# Patient Record
Sex: FEMALE | Race: WHITE | Employment: OTHER | URBAN - METROPOLITAN AREA
[De-identification: names, ages, dates, MRNs, and addresses within clinical notes are randomized per-mention and may not be internally consistent; named-entity substitution may affect disease eponyms.]

---

## 2019-10-06 ENCOUNTER — HOSPITAL ENCOUNTER (EMERGENCY)
Facility: CLINIC | Age: 69
Discharge: HOME | End: 2019-10-06
Attending: EMERGENCY MEDICINE | Admitting: EMERGENCY MEDICINE
Payer: COMMERCIAL

## 2019-10-06 ENCOUNTER — APPOINTMENT (OUTPATIENT)
Dept: GENERAL RADIOLOGY | Facility: CLINIC | Age: 69
End: 2019-10-06
Attending: EMERGENCY MEDICINE
Payer: COMMERCIAL

## 2019-10-06 VITALS
HEIGHT: 67 IN | HEART RATE: 61 BPM | RESPIRATION RATE: 18 BRPM | BODY MASS INDEX: 30.61 KG/M2 | OXYGEN SATURATION: 100 % | DIASTOLIC BLOOD PRESSURE: 88 MMHG | SYSTOLIC BLOOD PRESSURE: 133 MMHG | TEMPERATURE: 98.4 F | WEIGHT: 195 LBS

## 2019-10-06 DIAGNOSIS — R07.89 ATYPICAL CHEST PAIN: ICD-10-CM

## 2019-10-06 LAB
ANION GAP SERPL CALCULATED.3IONS-SCNC: 7 MMOL/L (ref 3–14)
BASOPHILS # BLD AUTO: 0 10E9/L (ref 0–0.2)
BASOPHILS NFR BLD AUTO: 0.3 %
BUN SERPL-MCNC: 26 MG/DL (ref 7–30)
CALCIUM SERPL-MCNC: 8.5 MG/DL (ref 8.5–10.1)
CHLORIDE SERPL-SCNC: 107 MMOL/L (ref 94–109)
CO2 SERPL-SCNC: 26 MMOL/L (ref 20–32)
CREAT SERPL-MCNC: 0.7 MG/DL (ref 0.52–1.04)
DIFFERENTIAL METHOD BLD: NORMAL
EOSINOPHIL # BLD AUTO: 0 10E9/L (ref 0–0.7)
EOSINOPHIL NFR BLD AUTO: 0.2 %
ERYTHROCYTE [DISTWIDTH] IN BLOOD BY AUTOMATED COUNT: 12.8 % (ref 10–15)
GFR SERPL CREATININE-BSD FRML MDRD: 87 ML/MIN/{1.73_M2}
GLUCOSE SERPL-MCNC: 108 MG/DL (ref 70–99)
HCT VFR BLD AUTO: 42.5 % (ref 35–47)
HGB BLD-MCNC: 14.6 G/DL (ref 11.7–15.7)
IMM GRANULOCYTES # BLD: 0 10E9/L (ref 0–0.4)
IMM GRANULOCYTES NFR BLD: 0.2 %
INTERPRETATION ECG - MUSE: NORMAL
LYMPHOCYTES # BLD AUTO: 0.8 10E9/L (ref 0.8–5.3)
LYMPHOCYTES NFR BLD AUTO: 13.8 %
MCH RBC QN AUTO: 31.3 PG (ref 26.5–33)
MCHC RBC AUTO-ENTMCNC: 34.4 G/DL (ref 31.5–36.5)
MCV RBC AUTO: 91 FL (ref 78–100)
MONOCYTES # BLD AUTO: 0.3 10E9/L (ref 0–1.3)
MONOCYTES NFR BLD AUTO: 4.4 %
NEUTROPHILS # BLD AUTO: 4.7 10E9/L (ref 1.6–8.3)
NEUTROPHILS NFR BLD AUTO: 81.1 %
NRBC # BLD AUTO: 0 10*3/UL
NRBC BLD AUTO-RTO: 0 /100
PLATELET # BLD AUTO: 257 10E9/L (ref 150–450)
POTASSIUM SERPL-SCNC: 3.6 MMOL/L (ref 3.4–5.3)
RBC # BLD AUTO: 4.67 10E12/L (ref 3.8–5.2)
SODIUM SERPL-SCNC: 140 MMOL/L (ref 133–144)
TROPONIN I SERPL-MCNC: <0.015 UG/L (ref 0–0.04)
WBC # BLD AUTO: 5.9 10E9/L (ref 4–11)

## 2019-10-06 PROCEDURE — 99285 EMERGENCY DEPT VISIT HI MDM: CPT | Mod: 25

## 2019-10-06 PROCEDURE — 80048 BASIC METABOLIC PNL TOTAL CA: CPT | Performed by: EMERGENCY MEDICINE

## 2019-10-06 PROCEDURE — 71046 X-RAY EXAM CHEST 2 VIEWS: CPT

## 2019-10-06 PROCEDURE — 84484 ASSAY OF TROPONIN QUANT: CPT | Performed by: EMERGENCY MEDICINE

## 2019-10-06 PROCEDURE — 85025 COMPLETE CBC W/AUTO DIFF WBC: CPT | Performed by: EMERGENCY MEDICINE

## 2019-10-06 PROCEDURE — 93005 ELECTROCARDIOGRAM TRACING: CPT

## 2019-10-06 PROCEDURE — 25000132 ZZH RX MED GY IP 250 OP 250 PS 637: Mod: GY | Performed by: EMERGENCY MEDICINE

## 2019-10-06 RX ORDER — ASPIRIN 81 MG/1
324 TABLET, CHEWABLE ORAL ONCE
Status: COMPLETED | OUTPATIENT
Start: 2019-10-06 | End: 2019-10-06

## 2019-10-06 RX ADMIN — ASPIRIN 81 MG 324 MG: 81 TABLET ORAL at 11:02

## 2019-10-06 ASSESSMENT — ENCOUNTER SYMPTOMS
SHORTNESS OF BREATH: 1
DIAPHORESIS: 1
CHEST TIGHTNESS: 1

## 2019-10-06 ASSESSMENT — MIFFLIN-ST. JEOR: SCORE: 1437.14

## 2019-10-06 NOTE — ED TRIAGE NOTES
Chest tightness across upper chest after climbing 100 plus stairs from bottom of a cave she visited yesterday.

## 2019-10-06 NOTE — ED PROVIDER NOTES
"  History     Chief Complaint:  Chest Pain    HPI   Julienne Torres is a 68 year old female who presents to the ED for evaluation of chest pain. The patient reports that she and her  returned from vacation yesterday, during which they went hiking in caves down in [place], [place]. The patient states that as their group was exiting a cave, the people leading the group went at a fast pace, and while trying to keep up, the patient became short of breath. The patient reports that she has experienced chest pain and tightness since yesterday, and in the ED she is diaphoretic.     Allergies:  The patient has no known drug allergies.    Medications:    Metoprolol  Premarin    Past Medical History:    Granulosa Cell-Theca Cell tumor  Hypertension     Past Surgical History:    Appendectomy  Bilateral oophorectomy  Total abd hysterectomy    Family History:    Prostate cancer, father    Social History:  Negative for tobacco use.  Negative for alcohol use.  Negative for drug use.  Marital Status:   [2]     Review of Systems   Constitutional: Positive for diaphoresis.   Respiratory: Positive for chest tightness and shortness of breath.    Cardiovascular: Positive for chest pain.   All other systems reviewed and are negative.      Physical Exam     Patient Vitals for the past 24 hrs:   BP Temp Temp src Pulse Resp SpO2 Height Weight   10/06/19 1027 (!) 175/95 98.4  F (36.9  C) Oral 75 18 100 % 1.702 m (5' 7\") 88.5 kg (195 lb)     Physical Exam  Eyes:  The pupils are equal and round    Conjunctivae and sclerae are normal  ENT:    The nose is normal    Pinnae are normal  CV:  Regular rate and rhythm     No edema  Resp:  Lungs are clear    Non-labored    No rales    No wheezing   GI:  Abdomen is soft and non-tender, there is no rigidity    No distension    No rebound tenderness   MS:  Normal muscular tone    No asymmetric leg swelling  Skin:  No rash or acute skin lesions noted  Neuro:   Awake, alert.      Speech is normal " and fluent.    Face is symmetric.     Moves all extremities    Emergency Department Course   ECG:  Indication: Chest Pain  Time: 1026  Vent. Rate 69 bpm. NY interval 172. QRS duration 96. QT/QTc 422/452. P-R-T axis 43 -56 22.  Normal sinus rhythm. Possible left atrial enlargement. Left anterior fascicular block. Left ventricular hypertrophy. Abnormal ECG. No significant change compared to EKG dated 12/22/. Read time: 12/22/2008    Imaging:  Radiographic findings were communicated with the patient who voiced understanding of the findings.  XR Chest 2 views:   There are no acute infiltrates. The cardiac silhouette is  not enlarged. Pulmonary vasculature is unremarkable as per radiology.    Laboratory:  CBC: WBC: 5.9, HGB: 14.6, PLT: 257  BMP: Glucose 108 (H), o/w WNL (Creatinine: 0.70)  1045 Troponin: <0.015    Interventions:  1102 Aspirin chewable tablet 324 mg PO    Emergency Department Course:  Nursing notes and vitals reviewed. (1037) I performed an exam of the patient as documented above.     ECG was obtained    IV inserted. Medicine administered as documented above. Blood drawn. This was sent to the lab for further testing, results above.     The patient was sent for a chest x-ray while in the emergency department, findings above.     1143 I rechecked the patient and discussed the results of her workup thus far.     Findings and plan explained to the Patient. Patient discharged home with instructions regarding supportive care, medications, and reasons to return. The importance of close follow-up was reviewed.     Impression & Plan    Medical Decision Making:  Julienne Torres is a 68 year old female who presents the emergency department with chest pain.  She reports that she thinks that she might have muscular chest pain.  She developed pain yesterday and is been constant since yesterday.  She notes it is worse when her shoulders are stretched backward.  EKG here is unchanged from her most recent EKG which was  greater than 10 years ago.  Troponin is undetectable and, given the duration of her symptoms, I doubt ACS or MI based on the duration of her symptoms and negative troponin.  No indication for repeat troponin.  Symptoms are not suggestive of pulmonary embolism.  Oxygenation level, heart rate and lack of leg swelling.  In addition, the description of her symptoms is would be very atypical for a pulmonary embolism.  Chest x-ray is normal here.  Suspect musculoskeletal cause.  She can follow-up with her doctor.  Return with any new or concerning symptoms.    Diagnosis:    ICD-10-CM   1. Atypical chest pain R07.89       Disposition:  The patient was discharged to home.    Scribe Disclosure:  IDanielle, am serving as a scribe on 10/6/2019 at 10:37 AM to personally document services performed by Arnoldo Talavera MD based on my observations and the provider's statements to me.     Danielle Chauhan  10/6/2019    EMERGENCY DEPARTMENT       Arnoldo Talavera MD  10/06/19 [id number]

## 2019-10-06 NOTE — ED AVS SNAPSHOT
Emergency Department  [address] [place] [address]  Phone:  [phone]  Fax:  [phone]                                    Julienne Torres   MRN: [id number]    Department:   Emergency Department   Date of Visit:  10/6/2019           After Visit Summary Signature Page    I have received my discharge instructions, and my questions have been answered. I have discussed any challenges I see with this plan with the nurse or doctor.    ..........................................................................................................................................  Patient/Patient Representative Signature      ..........................................................................................................................................  Patient Representative Print Name and Relationship to Patient    ..................................................               ................................................  Date                                   Time    ..........................................................................................................................................  Reviewed by Signature/Title    ...................................................              ..............................................  Date                                               Time          22EPIC Rev 08/18

## 2021-03-21 ENCOUNTER — HEALTH MAINTENANCE LETTER (OUTPATIENT)
Age: 71
End: 2021-03-21

## 2021-09-05 ENCOUNTER — HEALTH MAINTENANCE LETTER (OUTPATIENT)
Age: 71
End: 2021-09-05

## 2022-02-22 ENCOUNTER — HOSPITAL ENCOUNTER (OUTPATIENT)
Dept: MAMMOGRAPHY | Facility: CLINIC | Age: 72
Discharge: HOME | End: 2022-02-22
Attending: OBSTETRICS & GYNECOLOGY | Admitting: OBSTETRICS & GYNECOLOGY
Payer: COMMERCIAL

## 2022-02-22 DIAGNOSIS — Z12.31 BREAST CANCER SCREENING BY MAMMOGRAM: ICD-10-CM

## 2022-02-22 PROCEDURE — 77067 SCR MAMMO BI INCL CAD: CPT

## 2022-04-17 ENCOUNTER — HEALTH MAINTENANCE LETTER (OUTPATIENT)
Age: 72
End: 2022-04-17

## 2022-07-29 ENCOUNTER — TRANSFERRED RECORDS (OUTPATIENT)
Dept: MULTI SPECIALTY CLINIC | Facility: CLINIC | Age: 72
End: 2022-07-29

## 2022-10-23 ENCOUNTER — HEALTH MAINTENANCE LETTER (OUTPATIENT)
Age: 72
End: 2022-10-23

## 2022-11-21 ENCOUNTER — OFFICE VISIT (OUTPATIENT)
Dept: FAMILY MEDICINE | Facility: CLINIC | Age: 72
End: 2022-11-21
Payer: COMMERCIAL

## 2022-11-21 VITALS
WEIGHT: 204 LBS | DIASTOLIC BLOOD PRESSURE: 94 MMHG | SYSTOLIC BLOOD PRESSURE: 150 MMHG | OXYGEN SATURATION: 99 % | HEART RATE: 73 BPM | HEIGHT: 67 IN | TEMPERATURE: 98.4 F | BODY MASS INDEX: 32.02 KG/M2 | RESPIRATION RATE: 16 BRPM

## 2022-11-21 DIAGNOSIS — Z01.818 PREOP GENERAL PHYSICAL EXAM: Primary | ICD-10-CM

## 2022-11-21 DIAGNOSIS — H26.9 CATARACT, UNSPECIFIED CATARACT TYPE, UNSPECIFIED LATERALITY: ICD-10-CM

## 2022-11-21 PROCEDURE — 99204 OFFICE O/P NEW MOD 45 MIN: CPT | Performed by: NURSE PRACTITIONER

## 2022-11-21 RX ORDER — CETIRIZINE HYDROCHLORIDE 10 MG/1
10 TABLET ORAL DAILY PRN
COMMUNITY

## 2022-11-21 RX ORDER — IBUPROFEN 200 MG
200 TABLET ORAL EVERY 4 HOURS PRN
COMMUNITY

## 2022-11-21 ASSESSMENT — PAIN SCALES - GENERAL: PAINLEVEL: NO PAIN (0)

## 2022-11-21 NOTE — Clinical Note
Please abstract the following data from this visit with this patient into the appropriate field in Epic:  Tests that can be patient reported without a hard copy:  Colonoscopy done on this date: 7-29-22  by this group: [facility], results were normal 1 polyp, repeat 5 years .

## 2022-11-21 NOTE — PATIENT INSTRUCTIONS
Preparing for Your Surgery  Getting started  A nurse will call you to review your health history and instructions. They will give you an arrival time based on your scheduled surgery time. Please be ready to share:    Your doctor s clinic name and phone number    Your medical, surgical, and anesthesia history    A list of allergies and sensitivities    A list of medicines, including herbal treatments and over-the-counter drugs    Whether the patient has a legal guardian (ask how to send us the papers in advance)  Please tell us if you re pregnant--or if there s any chance you might be pregnant. Some surgeries may injure a fetus (unborn baby), so they require a pregnancy test. Surgeries that are safe for a fetus don t always need a test, and you can choose whether to have one.   If you have a child who s having surgery, please ask for a copy of Preparing for Your Child s Surgery.    Preparing for surgery    Within 10 to 30 days of surgery: Have a pre-op exam (sometimes called an H&P, or History and Physical). This can be done at a clinic or pre-operative center.  ? If you re having a , you may not need this exam. Talk to your care team.    At your pre-op exam, talk to your care team about all medicines you take. If you need to stop any medicines before surgery, ask when to start taking them again.  ? We do this for your safety. Many medicines can make you bleed too much during surgery. Some change how well surgery (anesthesia) drugs work.    Call your insurance company to let them know you re having surgery. (If you don t have insurance, call [phone].)    Call your clinic if there s any change in your health. This includes signs of a cold or flu (sore throat, runny nose, cough, rash, fever). It also includes a scrape or scratch near the surgery site.    If you have questions on the day of surgery, call your hospital or surgery center.  COVID testing  You may need to be tested for COVID-19 before having  surgery. If so, we will give you instructions (or click here).  Eating and drinking guidelines  For your safety: Unless your surgeon tells you otherwise, follow the guidelines below.    Eat and drink as usual until 8 hours before you arrive for surgery. After that, no food or milk.    Drink clear liquids until 2 hours before you arrive. These are liquids you can see through, like water, Gatorade, and Propel Water. They also include plain black coffee and tea (no cream or milk), candy, and breath mints. You can spit out gum when you arrive.    If you drink alcohol: Stop drinking it the night before surgery.    If your care team tells you to take medicine on the morning of surgery, it s okay to take it with a sip of water.  Preventing infection    Shower or bathe the night before and morning of your surgery. Follow the instructions your clinic gave you. (If no instructions, use regular soap.)    Don t shave or clip hair near your surgery site. We ll remove the hair if needed.    Don t smoke or vape the morning of surgery. You may chew nicotine gum up to 2 hours before surgery. A nicotine patch is okay.  ? Note: Some surgeries require you to completely quit smoking and nicotine. Check with your surgeon.    Your care team will make every effort to keep you safe from infection. We will:  ? Clean our hands often with soap and water (or an alcohol-based hand rub).  ? Clean the skin at your surgery site with a special soap that kills germs.  ? Give you a special gown to keep you warm. (Cold raises the risk of infection.)  ? Wear special hair covers, masks, gowns and gloves during surgery.  ? Give antibiotic medicine, if prescribed. Not all surgeries need antibiotics.  What to bring on the day of surgery    Photo ID and insurance card    Copy of your health care directive, if you have one    Glasses and hearing aids (bring cases)  ? You can t wear contacts during surgery    Inhaler and eye drops, if you use them (tell us  about these when you arrive)    CPAP machine or breathing device, if you use them    A few personal items, if spending the night    If you have . . .  ? A pacemaker, ICD (cardiac defibrillator) or other implant: Bring the ID card.  ? An implanted stimulator: Bring the remote control.  ? A legal guardian: Bring a copy of the certified (court-stamped) guardianship papers.  Please remove any jewelry, including body piercings. Leave jewelry and other valuables at home.  If you re going home the day of surgery    You must have a responsible adult drive you home. They should stay with you overnight as well.    If you don t have someone to stay with you, and you aren t safe to go home alone, we may keep you overnight. Insurance often won t pay for this.  After surgery  If it s hard to control your pain or you need more pain medicine, please call your surgeon s office.  Questions?   If you have any questions for your care team, list them here:   ____________________________________________________________________________________________________________________________________________________________________________________________________________________________________________________________________  For informational purposes only. Not to replace the advice of your health care provider. Copyright   2003, 2019 [employer]. All rights reserved. Clinically reviewed by Wanda Morales MD. [employer] [id number] - REV 10/22.

## 2022-11-21 NOTE — PROGRESS NOTES
[address], [address]  [facility] [address]  Phone: [phone]  Primary Provider: No Ref-Primary, Physician  Pre-op Performing Provider: KATARINA PENA      PREOPERATIVE EVALUATION:  Today's date: 11/21/2022    Julienne Torres is a 72 year old female who presents for a preoperative evaluation.    Surgical Information:  Surgery/Procedure: Cataract   Surgery Location: [facility]   Surgeon: Keegan   Surgery Date: 12-2-22 RIGHT eye then 12-16-22 LEFT eye  Time of Surgery: AM  Where patient plans to recover: At home with family  Fax number for surgical facility: [phone]    Type of Anesthesia Anticipated: Local with MAC    Assessment & Plan     The proposed surgical procedure is considered LOW risk.    (Z01.818) Preop general physical exam  (primary encounter diagnosis)  Comment: ok for surgery. No concerns today. BP is elevated today but she brought her records of recent BP that was normal just a couple months ago.  She is a little anxious today.   Plan:     (H26.9) Cataract, unspecified cataract type, unspecified laterality  Comment:   Plan:            Risks and Recommendations:  The patient has the following additional risks and recommendations for perioperative complications:   - No identified additional risk factors other than previously addressed    Medication Instructions:  Patient is to take all scheduled medications on the day of surgery    RECOMMENDATION:  APPROVAL GIVEN to proceed with proposed procedure, without further diagnostic evaluation.        Subjective     HPI related to upcoming procedure: going for cataract  Has been feeling well lately. No CP, SOB.   Doctors with OB for primary care   Was just at OB in May and BP was 124/80. Sept 2 130/74 (brought in records)  Sept 2 of this year was dx lyme        Preop Questions 11/16/2022   1. Have you ever had a heart attack or stroke? No   2. Have you ever had surgery on your heart or blood  vessels, such as a stent placement, a coronary artery bypass, or surgery on an artery in your head, neck, heart, or legs? No   3. Do you have chest pain with activity? No   4. Do you have a history of  heart failure? No   5. Do you currently have a cold, bronchitis or symptoms of other infection? No   6. Do you have a cough, shortness of breath, or wheezing? No   7. Do you or anyone in your family have previous history of blood clots? No   8. Do you or does anyone in your family have a serious bleeding problem such as prolonged bleeding following surgeries or cuts? No   9. Have you ever had problems with anemia or been told to take iron pills? No   10. Have you had any abnormal blood loss such as black, tarry or bloody stools, or abnormal vaginal bleeding? No   11. Have you ever had a blood transfusion? No   12. Are you willing to have a blood transfusion if it is medically needed before, during, or after your surgery? Yes   13. Have you or any of your relatives ever had problems with anesthesia? No   14. Do you have sleep apnea, excessive snoring or daytime drowsiness? No   15. Do you have any artifical heart valves or other implanted medical devices like a pacemaker, defibrillator, or continuous glucose monitor? No   16. Do you have artificial joints? No   17. Are you allergic to latex? No       Health Care Directive:  Patient does not have a Health Care Directive or Living Will:     Preoperative Review of :   reviewed - no record of controlled substances prescribed.      Status of Chronic Conditions:  See problem list for active medical problems.  Problems all longstanding and stable, except as noted/documented.  See ROS for pertinent symptoms related to these conditions.      Review of Systems  Constitutional, neuro, ENT, endocrine, pulmonary, cardiac, gastrointestinal, genitourinary, musculoskeletal, integument and psychiatric systems are negative, except as otherwise noted.    Patient Active Problem List  "   Diagnosis Date Noted     CARDIOVASCULAR SCREENING; LDL GOAL LESS THAN 160 10/31/2010     Priority: Medium     Abnormal ECG 12/22/2008     Priority: Medium      Past Medical History:   Diagnosis Date     Abnormal ECG 12/22/2008     Granulosa cell-theca cell tumor      Hypertension      Past Surgical History:   Procedure Laterality Date     C APPENDECTOMY  1972     C REMOVAL OF OVARY(S)  11/1994    bilateral     C TOTAL ABDOM HYSTERECTOMY  11/1994    due granulosa theca cell tumor     Current Outpatient Medications   Medication Sig Dispense Refill     METOPROLOL SUCCINATE# 25 MG OR TB24 1 TABLET DAILY 30 0     PREMARIN 0.3 MG OR TABS 1 EVERY OTHER DAILY         No Known Allergies     Social History     Tobacco Use     Smoking status: Never     Smokeless tobacco: Not on file   Substance Use Topics     Alcohol use: No     Family History   Problem Relation Age of Onset     Cancer Father         prostate     Cardiovascular Maternal Grandmother         heart attack     Cancer Maternal Grandfather         lung cancer     History   Drug Use No         Objective     BP (!) 150/94 (BP Location: Left arm)   Pulse 73   Temp 98.4  F (36.9  C) (Temporal)   Resp 16   Ht 1.702 m (5' 7\")   Wt 92.5 kg (204 lb)   SpO2 99%   BMI 31.95 kg/m      Physical Exam    GENERAL APPEARANCE: healthy, alert and no distress     EYES: EOMI,      RESP: lungs clear to auscultation - no rales, rhonchi or wheezes     CV: regular rates and rhythm, normal S1 S2, no S3 or S4 and no murmur, click or rub     MS: extremities normal- no gross deformities noted, no evidence of inflammation in joints, FROM in all extremities.     SKIN: no suspicious lesions or rashes     NEURO: Normal strength and tone, sensory exam grossly normal, mentation intact and speech normal     PSYCH: mentation appears normal. and affect normal/bright       No results for input(s): HGB, PLT, INR, NA, POTASSIUM, CR, A1C in the last 92162 hours.     Diagnostics:  No labs were " ordered during this visit.   No EKG required for low risk surgery (cataract, skin procedure, breast biopsy, etc).    Revised Cardiac Risk Index (RCRI):  The patient has the following serious cardiovascular risks for perioperative complications:   - No serious cardiac risks = 0 points     RCRI Interpretation: 0 points: Class I (very low risk - 0.4% complication rate)           Signed Electronically by: BRIA Kraus CNP  Copy of this evaluation report is provided to requesting physician.

## 2023-06-01 ENCOUNTER — HEALTH MAINTENANCE LETTER (OUTPATIENT)
Age: 73
End: 2023-06-01

## 2023-07-11 ENCOUNTER — OFFICE VISIT (OUTPATIENT)
Dept: VASCULAR SURGERY | Facility: CLINIC | Age: 73
End: 2023-07-11
Payer: COMMERCIAL

## 2023-07-11 DIAGNOSIS — I83.813 VARICOSE VEINS OF BILATERAL LOWER EXTREMITIES WITH PAIN: Primary | ICD-10-CM

## 2023-07-11 PROCEDURE — 99203 OFFICE O/P NEW LOW 30 MIN: CPT | Mod: GT | Performed by: SURGERY

## 2023-07-11 NOTE — NURSING NOTE
Patient Reported symptoms:    Bilateral leg   Heaviness A little of the time   Achiness Some of the time   Swelling Some of the time   Throbbing None of the time   Itching Some of the time   Appearance Very noticeable   Impact on work/activities Mildly reduced

## 2023-07-11 NOTE — LETTER
7/11/2023         RE: Julienne Torres  [facility] [address] [place] [id number]        Dear Colleague,    Thank you for referring your patient, Julienne Torres, to the [facility] [place]. Please see a copy of my visit note below.    No notes on file    Again, thank you for allowing me to participate in the care of your patient.        Sincerely,        Vasquez Carty MD

## 2023-07-11 NOTE — PROGRESS NOTES
[facility] CONSULTATION    HPI:    Julienne Torres is a pleasant 72 year old female who presents with complaints of bilateral lower extremity pain and and varicose veins.  Varicose veins have been present for about 15 years. The pain is described as an aching, tiredness, heaviness, worse when standing for long periods of time or sitting for long periods of time and improving with elevation leg and ibuprofen on an as-needed basis..  The patient has not used compression hose to any significant extent.  Symptoms located primarily in the lateral thighs and make it difficult for her to sit for long periods of time without having to reposition her legs or to get up and take short walks.  Additionally, when she is gardening, the pain causes her to have to take breaks as well.    She has a history of venous thromboembolism but does admit to mild swelling of her legs toward the end of the day.    Her family history is significant for varicose veins.    PAST MEDICAL HISTORY:   Past Medical History:   Diagnosis Date    Abnormal ECG 12/22/2008    Granulosa cell-theca cell tumor     Hypertension        PAST SURGICAL HISTORY:   Past Surgical History:   Procedure Laterality Date    ZZC APPENDECTOMY  1972    ZZC REMOVAL OF OVARY(S)  11/1994    bilateral    ZZC TOTAL ABDOM HYSTERECTOMY  11/1994    due granulosa theca cell tumor       FAMILY HISTORY:   Family History   Problem Relation Age of Onset    Cancer Father         prostate    Cardiovascular Maternal Grandmother         heart attack    Cancer Maternal Grandfather         lung cancer       SOCIAL HISTORY:   Social History     Tobacco Use    Smoking status: Never    Smokeless tobacco: Never   Substance Use Topics    Alcohol use: No       REVIEW OF SYSTEMS: Review Of Systems  Skin: negative  Eyes: negative  Ears/Nose/Throat: negative  Respiratory: No shortness of breath, dyspnea on exertion, cough, or hemoptysis  Cardiovascular: negative  Gastrointestinal:  negative  Genitourinary: negative  Musculoskeletal: negative  Neurologic: negative  Psychiatric: negative  Hematologic/Lymphatic/Immunologic: negative  Endocrine: negative      Vital signs:  There were no vitals taken for this visit.    Current Outpatient Medications   Medication Sig Dispense Refill    ibuprofen (ADVIL/MOTRIN) 200 MG tablet Take 200 mg by mouth every 4 hours as needed for pain      cetirizine (ZYRTEC) 10 MG tablet Take 10 mg by mouth daily as needed for allergies (Patient not taking: Reported on 7/11/2023)         PHYSICAL EXAM:  General: Pleasant, NAD.   HEENT: Normocephalic, atraumatic, external ears and nose normal.   Respiratory: Normal respiratory effort.   Cardiovascular: Pulse is regular.   Musculoskeletal: Gait and station normal.  The joints of her fingers and toes without deformity.  There is no cyanosis of her nailbeds.   EXTREMITIES: Right lower extremity: Prominent reticular veins over the lateral distal right thighs with interspersed telangiectasias.  Small varicose veins on the posterior lateral right thigh extending onto the lateral right leg.  No significant stasis changes    Left lower extremity: Similar, intense, proliferative telangiectasias and reticular veins as well as small varicose veins on the posterior lateral left thigh.  This.    PULSES: R/L (3=normal pulse, 0=no palpable pulse) dorsalis pedis: 1/2; posterior tibial: 3/3.      Neurologic: Grossly normal  Psychiatric: Mood, affect, judgment and insight are normal     ASSESSMENT:  Bilateral lower extremity pain, primarily anterolateral and posterior lateral thighs.  We discussed lower extremity vein anatomy, the pathophysiology of venous insufficiency and the option of continued conservative measures.  We utilized a leg vein drawing during this discussion.  The symptoms are significant enough that the patient has to change her actives of daily living to accommodate them.    She would like to proceed with bilateral lower  extremity venous competency studies.  We briefly discussed options for treating superficial axial incompetence utilizing endovenous ablation and treatment of superficial veins with sclerotherapy either under ultrasound guidance or direct vision.  She voiced understanding of the discussion.  Risk and benefits were also discussed.    PLAN:  1.  Bilateral lower extremity venous competency study with video visit to discuss results  2.  Begin conservative treatment with compression    Vasquez Carty MD    Dictated using Dragon voice recognition software which may result in transcription errors          [facility] LEG DRAWING:

## 2023-08-04 ENCOUNTER — VIRTUAL VISIT (OUTPATIENT)
Dept: VASCULAR SURGERY | Facility: CLINIC | Age: 73
End: 2023-08-04
Attending: SURGERY
Payer: COMMERCIAL

## 2023-08-04 ENCOUNTER — ANCILLARY PROCEDURE (OUTPATIENT)
Dept: ULTRASOUND IMAGING | Facility: CLINIC | Age: 73
End: 2023-08-04
Attending: SURGERY
Payer: COMMERCIAL

## 2023-08-04 DIAGNOSIS — I83.813 VARICOSE VEINS OF BILATERAL LOWER EXTREMITIES WITH PAIN: ICD-10-CM

## 2023-08-04 DIAGNOSIS — I83.813 VARICOSE VEINS OF BILATERAL LOWER EXTREMITIES WITH PAIN: Primary | ICD-10-CM

## 2023-08-04 PROCEDURE — 93970 EXTREMITY STUDY: CPT | Performed by: SURGERY

## 2023-08-04 PROCEDURE — 99213 OFFICE O/P EST LOW 20 MIN: CPT | Mod: VID | Performed by: SURGERY

## 2023-08-04 NOTE — LETTER
2023         RE: Julienne Torres  [address] [place] [id number]        Dear Colleague,    Thank you for referring your patient, Julienne Torres, to the [facility] [place]. Please see a copy of my visit note below.    Julienne is a 72 year old who is being evaluated via a billable video visit.      How would you like to obtain your AVS? MyChart  If the video visit is dropped, the invitation should be resent by: Text to cell phone: [phone]  Will anyone else be joining your video visit? No    Video-Visit Details    Type of service:  Video Visit   Video Start Time: 4:22 PM  Video End Time: 4:33 PM    Originating Location (pt. Location): Home    Distant Location (provider location):  On-site    Platform used for Video Visit: [employer]    [facility] [place] Progress Note    Julienne Torres presents in follow-up of bilateral lower extremity pain and varicose veins.  Please see my consultation of 2023 for details.  She returned on 2023 for bilateral lower extremity venous competency studies, the results which we will discuss on today's video visit.    Physical Exam  General: Pleasant female in no acute distress.  Blood pressure 139/80, pulse 60  Extremities: Right lower extremity: Prominent reticular veins over the lateral distal right thighs with interspersed telangiectasias.  Small varicose veins on the posterior lateral right thigh extending onto the lateral right leg.  No significant stasis changes     Left lower extremity: Similar, intense, proliferative telangiectasias and reticular veins as well as small varicose veins on the posterior lateral left thigh    Ultrasound:      A  Narrative & Impression   Verena Adan on 2023  4:10 PM   Name:  Julienne Torres                                                       Patient ID: [id number]  Date: 2023                                                  :  1950  Sex: female                                                                 Examined by: SMITH Adan RVT  Age:  72 year old                                                         Reading MD: CLIFF     INDICATION:  Varicose veins of bilateral lower extremities with pain.     EXAM TYPE  BILATERAL LOWER EXTREMITY VENOUS DUPLEX FOR VENOUS INSUFFICIENCY  TECHNICAL SUMMARY     A duplex ultrasound study using color flow was performed, to evaluate the bilateral lower extremity veins for valvular incompetence with the patient in a steep reversed trendelenberg.      RIGHT:     The deep veins demonstrate phasic flow, compress, and respond to augmentations.  There is no evidence of DVT.  The proximal femoral vein is incompetent and free of thrombus. The remaining deep veins are competent and free of thrombus.      The GSV demonstrates phasic flow, compresses, and responds to augmentations from the saphenofemoral junction to the ankle with no evidence of thrombus. The greater saphenous vein measures 6.4 mm at the saphenofemoral junction, 6.1 mm in the proximal thigh, and 4.0 mm at the knee. The GSV courses superficially out of the fascia form the distal thigh to the distal calf. The GSV is incompetent at the knee and mid calf with the greatest reflux time of 1254 milliseconds. The GSV gives rise to multiple incompetent varicose veins, the largest measures 3.9 mm off the proximal calf that courses medially with a reflux time of 1485 milliseconds.      The AASV is competent/incompetent ( 3.8 mm) draining into the saphenofemoral junction.      The Giacomini vein is incompetent ( 3.3  mm)  in the distal thigh and courses deep (1.6 mm) in the mid thigh.  The Giacomini vein gives rise to a varicose vein measuring 4.0 mm that courses posteromedially.     The SSV demonstrates phasic flow, compresses, and responds to augmentations from the popliteal space to the ankle.  No thrombus is seen. The saphenopopliteal junction is  absent. The SSV is incompetent from the proximal to mid calf with a reflux time of 8215 milliseconds.       Perforators:  There is an incompetent  vein ( 4.2 mm) at  15 cm from medial malleolus that communicates with the GSV.      LEFT:     The deep veins demonstrate phasic flow, compress, and respond to augmentations.  There is no evidence of DVT.  The common femoral vein is incompetent and free of thrombus. The remaining deep veins are competent and free of thrombus.      The GSV demonstrates phasic flow, compresses, and responds to augmentations from the saphenofemoral junction to the ankle with no evidence of thrombus. The greater saphenous vein measures 6.4 mm at the saphenofemoral junction, 3.9 mm in the proximal thigh, and 3.2 mm at the knee. The GSV is incompetent in the proximal thigh and again from the distal thigh to the distal calf with the greatest reflux time of 4916 milliseconds.       The AASV is not visualized.      The Giacomini vein is absent.     The SSV demonstrates phasic flow, compresses, and responds to augmentations from the popliteal space to the ankle.  No reflux or thrombus is seen.. The saphenopopliteal junction is absent.     Perforators: There is an incompetent  vein ( 4.1 mm) at  10 cm from the medial knee crease in the distal thigh that communicates with a varicose vein that measures 5.5 mm.     FINAL SUMMARY:  Right lower extremity:  Deep veins  1.  No deep vein thrombosis  2.  Right proximal femoral vein incompetence     Superficial veins  1.  Segmental, insignificant right knee at the mid calf great saphenous vein incompetence  2.  Right distal Vein of Giacomini and proximal to mid calf small saphenous vein incompetence  3.  4 mm diameter incompetent vein branch coursing down the posterior thigh, communicating with the Vein of Giacomini and small saphenous veins  4.  Incompetent  right mid medial calf     Left lower extremity:  Deep veins  1.  No deep  vein thrombosis  2.  Left common femoral vein incompetence     Superficial veins  1.  Left great saphenous vein incompetence in the proximal thigh and from the distal thigh to the ankle  2.  Incompetent  lateral left popliteal crease communicating with a varicose vein coursing down the posterior lateral thigh and calf  3.  The remaining superficial veins are competent.  4.  3.1 mm diameter vein noted on the distal posterolateral left thigh     Incompetence Criteria:  Greater than 500 milliseconds of reflux measured in the superficial and perforating veins and greater than 1000 milliseconds of reflux measured in the deep veins.     MARIA INES Carty MD, FACS          Assessment:  Bilateral lower extremity pain located primarily in the posterolateral thighs and legs.  This seems to occur when sitting for long periods of time and when working in her yard.    We discussed the results of her lower extremity venous competency study utilizing a leg vein drawing.  The pain on her right lower extremity appears to be related to an incompetent vein branch coursing down the posterior thigh and communicating with an incompetent Vein of Giacomini which is continuous with an incompetent proximal to mid calf small saphenous vein.  There are also varicosities located on the distal lateral right thigh and leg.    In the left lower extremity, she has similar varicosities on the posterior lateral left leg with an incompetent  in the left lateral popliteal fossa, likely contributing to her pain.  Varicose veins in the popliteal fossa originate from the incompetent .    She has not tried conservative management with use of compression hose to any significant extent.  She wishes to try compression to see if this would be of benefit and if not, she will contact us.    Plan:  Conservative management with addition of compression hose and continued exercise, leg elevation and dietary measures.  She will return  if she does not get adequate relief of her symptoms from them.  Consideration could be given to endovenous ablation of the right small saphenous vein with medically necessary sclerotherapy of the varicosities coursing down the posterior lateral left thigh and leg.:  Left lower extremity, radiofrequency ablation of the incompetent perforating vein with medically necessary sclerotherapy of posterior lateral thigh and calf varicosities may be of benefit.    Vasquez Carty MD FACS    Dictated using Dragon voice recognition software which may result in transcription errors            Again, thank you for allowing me to participate in the care of your patient.        Sincerely,        Vasquez Carty MD

## 2023-08-04 NOTE — PROGRESS NOTES
Julienne is a 72 year old who is being evaluated via a billable video visit.      How would you like to obtain your AVS? MyChart  If the video visit is dropped, the invitation should be resent by: Text to cell phone: [phone]  Will anyone else be joining your video visit? No    Video-Visit Details    Type of service:  Video Visit   Video Start Time: 4:22 PM  Video End Time: 4:33 PM    Originating Location (pt. Location): Home    Distant Location (provider location):  On-site    Platform used for Video Visit: [facility] [place] Progress Note    Julienne Torres presents in follow-up of bilateral lower extremity pain and varicose veins.  Please see my consultation of 2023 for details.  She returned on 2023 for bilateral lower extremity venous competency studies, the results which we will discuss on today's video visit.    Physical Exam  General: Pleasant female in no acute distress.  Blood pressure 139/80, pulse 60  Extremities: Right lower extremity: Prominent reticular veins over the lateral distal right thighs with interspersed telangiectasias.  Small varicose veins on the posterior lateral right thigh extending onto the lateral right leg.  No significant stasis changes     Left lower extremity: Similar, intense, proliferative telangiectasias and reticular veins as well as small varicose veins on the posterior lateral left thigh    Ultrasound:      A  Narrative & Impression   Verena Adan on 2023  4:10 PM   Name:  Julienne Torres                                                       Patient ID: [id number]  Date: 2023                                                  : 1950  Sex: female                                                                 Examined by: SMITH Adan RVT  Age:  72 year old                                                         Reading MD: CLIFF     INDICATION:  Varicose veins of bilateral lower extremities with pain.      EXAM TYPE  BILATERAL LOWER EXTREMITY VENOUS DUPLEX FOR VENOUS INSUFFICIENCY  TECHNICAL SUMMARY     A duplex ultrasound study using color flow was performed, to evaluate the bilateral lower extremity veins for valvular incompetence with the patient in a steep reversed trendelenberg.      RIGHT:     The deep veins demonstrate phasic flow, compress, and respond to augmentations.  There is no evidence of DVT.  The proximal femoral vein is incompetent and free of thrombus. The remaining deep veins are competent and free of thrombus.      The GSV demonstrates phasic flow, compresses, and responds to augmentations from the saphenofemoral junction to the ankle with no evidence of thrombus. The greater saphenous vein measures 6.4 mm at the saphenofemoral junction, 6.1 mm in the proximal thigh, and 4.0 mm at the knee. The GSV courses superficially out of the fascia form the distal thigh to the distal calf. The GSV is incompetent at the knee and mid calf with the greatest reflux time of 1254 milliseconds. The GSV gives rise to multiple incompetent varicose veins, the largest measures 3.9 mm off the proximal calf that courses medially with a reflux time of 1485 milliseconds.      The AASV is competent/incompetent ( 3.8 mm) draining into the saphenofemoral junction.      The Giacomini vein is incompetent ( 3.3  mm)  in the distal thigh and courses deep (1.6 mm) in the mid thigh.  The Giacomini vein gives rise to a varicose vein measuring 4.0 mm that courses posteromedially.     The SSV demonstrates phasic flow, compresses, and responds to augmentations from the popliteal space to the ankle.  No thrombus is seen. The saphenopopliteal junction is absent. The SSV is incompetent from the proximal to mid calf with a reflux time of 8215 milliseconds.       Perforators:  There is an incompetent  vein ( 4.2 mm) at  15 cm from medial malleolus that communicates with the GSV.      LEFT:     The deep veins demonstrate phasic  flow, compress, and respond to augmentations.  There is no evidence of DVT.  The common femoral vein is incompetent and free of thrombus. The remaining deep veins are competent and free of thrombus.      The GSV demonstrates phasic flow, compresses, and responds to augmentations from the saphenofemoral junction to the ankle with no evidence of thrombus. The greater saphenous vein measures 6.4 mm at the saphenofemoral junction, 3.9 mm in the proximal thigh, and 3.2 mm at the knee. The GSV is incompetent in the proximal thigh and again from the distal thigh to the distal calf with the greatest reflux time of 4916 milliseconds.       The AASV is not visualized.      The Giacomini vein is absent.     The SSV demonstrates phasic flow, compresses, and responds to augmentations from the popliteal space to the ankle.  No reflux or thrombus is seen.. The saphenopopliteal junction is absent.     Perforators: There is an incompetent  vein ( 4.1 mm) at  10 cm from the medial knee crease in the distal thigh that communicates with a varicose vein that measures 5.5 mm.     FINAL SUMMARY:  Right lower extremity:  Deep veins  1.  No deep vein thrombosis  2.  Right proximal femoral vein incompetence     Superficial veins  1.  Segmental, insignificant right knee at the mid calf great saphenous vein incompetence  2.  Right distal Vein of Giacomini and proximal to mid calf small saphenous vein incompetence  3.  4 mm diameter incompetent vein branch coursing down the posterior thigh, communicating with the Vein of Giacomini and small saphenous veins  4.  Incompetent  right mid medial calf     Left lower extremity:  Deep veins  1.  No deep vein thrombosis  2.  Left common femoral vein incompetence     Superficial veins  1.  Left great saphenous vein incompetence in the proximal thigh and from the distal thigh to the ankle  2.  Incompetent  lateral left popliteal crease communicating with a varicose vein  coursing down the posterior lateral thigh and calf  3.  The remaining superficial veins are competent.  4.  3.1 mm diameter vein noted on the distal posterolateral left thigh     Incompetence Criteria:  Greater than 500 milliseconds of reflux measured in the superficial and perforating veins and greater than 1000 milliseconds of reflux measured in the deep veins.     MARIA INES Carty MD, FACS          Assessment:  Bilateral lower extremity pain located primarily in the posterolateral thighs and legs.  This seems to occur when sitting for long periods of time and when working in her yard.    We discussed the results of her lower extremity venous competency study utilizing a leg vein drawing.  The pain on her right lower extremity appears to be related to an incompetent vein branch coursing down the posterior thigh and communicating with an incompetent Vein of Giacomini which is continuous with an incompetent proximal to mid calf small saphenous vein.  There are also varicosities located on the distal lateral right thigh and leg.    In the left lower extremity, she has similar varicosities on the posterior lateral left leg with an incompetent  in the left lateral popliteal fossa, likely contributing to her pain.  Varicose veins in the popliteal fossa originate from the incompetent .    She has not tried conservative management with use of compression hose to any significant extent.  She wishes to try compression to see if this would be of benefit and if not, she will contact us.    Plan:  Conservative management with addition of compression hose and continued exercise, leg elevation and dietary measures.  She will return if she does not get adequate relief of her symptoms from them.  Consideration could be given to endovenous ablation of the right small saphenous vein with medically necessary sclerotherapy of the varicosities coursing down the posterior lateral left thigh and leg.:  Left lower  extremity, radiofrequency ablation of the incompetent perforating vein with medically necessary sclerotherapy of posterior lateral thigh and calf varicosities may be of benefit.    Vasquez Carty MD FACS    Dictated using Dragon voice recognition software which may result in transcription errors

## 2023-08-27 ENCOUNTER — HEALTH MAINTENANCE LETTER (OUTPATIENT)
Age: 73
End: 2023-08-27

## 2024-10-20 ENCOUNTER — HEALTH MAINTENANCE LETTER (OUTPATIENT)
Age: 74
End: 2024-10-20

## 2025-05-27 ENCOUNTER — HOSPITAL ENCOUNTER (OUTPATIENT)
Dept: MAMMOGRAPHY | Facility: CLINIC | Age: 75
Discharge: HOME | End: 2025-05-27
Payer: COMMERCIAL

## 2025-05-27 DIAGNOSIS — Z12.31 VISIT FOR SCREENING MAMMOGRAM: ICD-10-CM

## 2025-05-27 PROCEDURE — 77067 SCR MAMMO BI INCL CAD: CPT
